# Patient Record
Sex: FEMALE | Race: WHITE | NOT HISPANIC OR LATINO | URBAN - METROPOLITAN AREA
[De-identification: names, ages, dates, MRNs, and addresses within clinical notes are randomized per-mention and may not be internally consistent; named-entity substitution may affect disease eponyms.]

---

## 2017-01-04 ENCOUNTER — HOSPITAL ENCOUNTER (OUTPATIENT)
Dept: CT IMAGING | Facility: CLINIC | Age: 82
Setting detail: RADIATION/ONCOLOGY SERIES
Discharge: STILL ADMITTED OR ADMINISTRATIVE | End: 2017-01-04
Attending: INTERNAL MEDICINE

## 2017-01-04 ENCOUNTER — COMMUNICATION - HEALTHEAST (OUTPATIENT)
Dept: TELEHEALTH | Facility: CLINIC | Age: 82
End: 2017-01-04

## 2017-01-04 DIAGNOSIS — C79.9 SECONDARY MALIGNANT NEOPLASM OF UNSPECIFIED SITE (CODE) (H): ICD-10-CM

## 2017-01-04 DIAGNOSIS — C50.919 METASTATIC BREAST CANCER: ICD-10-CM

## 2017-01-11 ENCOUNTER — AMBULATORY - HEALTHEAST (OUTPATIENT)
Dept: INFUSION THERAPY | Facility: CLINIC | Age: 82
End: 2017-01-11

## 2017-01-11 ENCOUNTER — INFUSION - HEALTHEAST (OUTPATIENT)
Dept: INFUSION THERAPY | Facility: CLINIC | Age: 82
End: 2017-01-11

## 2017-01-11 ENCOUNTER — OFFICE VISIT - HEALTHEAST (OUTPATIENT)
Dept: ONCOLOGY | Facility: CLINIC | Age: 82
End: 2017-01-11

## 2017-01-11 DIAGNOSIS — C50.919 METASTATIC BREAST CANCER: ICD-10-CM

## 2017-01-11 DIAGNOSIS — C79.51 BONE METASTASES: ICD-10-CM

## 2017-01-11 DIAGNOSIS — C50.919 MALIGNANT NEOPLASM OF FEMALE BREAST (H): ICD-10-CM

## 2017-01-11 ASSESSMENT — MIFFLIN-ST. JEOR: SCORE: 1325.39

## 2017-01-13 LAB — BREAST CARCINOMA ASSOC AG(CA 27.29) - HISTORICAL: 24.8 U/ML

## 2017-02-07 ENCOUNTER — RECORDS - HEALTHEAST (OUTPATIENT)
Dept: LAB | Facility: CLINIC | Age: 82
End: 2017-02-07

## 2017-02-07 LAB
CHOLEST SERPL-MCNC: 189 MG/DL
FASTING STATUS PATIENT QL REPORTED: NORMAL
HDLC SERPL-MCNC: 59 MG/DL
LDLC SERPL CALC-MCNC: 113 MG/DL
TRIGL SERPL-MCNC: 85 MG/DL

## 2017-02-08 ENCOUNTER — INFUSION - HEALTHEAST (OUTPATIENT)
Dept: INFUSION THERAPY | Facility: CLINIC | Age: 82
End: 2017-02-08

## 2017-02-08 ENCOUNTER — OFFICE VISIT - HEALTHEAST (OUTPATIENT)
Dept: ONCOLOGY | Facility: CLINIC | Age: 82
End: 2017-02-08

## 2017-02-08 ENCOUNTER — AMBULATORY - HEALTHEAST (OUTPATIENT)
Dept: INFUSION THERAPY | Facility: CLINIC | Age: 82
End: 2017-02-08

## 2017-02-08 DIAGNOSIS — C50.919 METASTATIC BREAST CANCER: ICD-10-CM

## 2017-02-08 DIAGNOSIS — C50.919 MALIGNANT NEOPLASM OF FEMALE BREAST (H): ICD-10-CM

## 2017-02-08 DIAGNOSIS — C79.51 BONE METASTASES: ICD-10-CM

## 2017-02-08 RX ORDER — MIRTAZAPINE 15 MG/1
22.5 TABLET, FILM COATED ORAL AT BEDTIME
Status: SHIPPED | COMMUNITY
Start: 2017-02-08

## 2017-02-08 RX ORDER — ACETAMINOPHEN 500 MG
500 TABLET ORAL EVERY 6 HOURS PRN
Status: SHIPPED | COMMUNITY
Start: 2017-02-08

## 2017-02-10 LAB — BREAST CARCINOMA ASSOC AG(CA 27.29) - HISTORICAL: 28.3 U/ML

## 2017-03-09 ENCOUNTER — AMBULATORY - HEALTHEAST (OUTPATIENT)
Dept: INFUSION THERAPY | Facility: CLINIC | Age: 82
End: 2017-03-09

## 2017-03-09 ENCOUNTER — INFUSION - HEALTHEAST (OUTPATIENT)
Dept: INFUSION THERAPY | Facility: CLINIC | Age: 82
End: 2017-03-09

## 2017-03-09 ENCOUNTER — OFFICE VISIT - HEALTHEAST (OUTPATIENT)
Dept: ONCOLOGY | Facility: CLINIC | Age: 82
End: 2017-03-09

## 2017-03-09 DIAGNOSIS — C50.919 MALIGNANT NEOPLASM OF FEMALE BREAST (H): ICD-10-CM

## 2017-03-09 DIAGNOSIS — C50.919 METASTATIC BREAST CANCER: ICD-10-CM

## 2017-03-09 DIAGNOSIS — Z79.818 USE OF FULVESTRANT (FASLODEX): ICD-10-CM

## 2017-03-09 DIAGNOSIS — C79.51 BONE METASTASES: ICD-10-CM

## 2017-03-09 ASSESSMENT — MIFFLIN-ST. JEOR: SCORE: 1327.66

## 2017-03-11 LAB — BREAST CARCINOMA ASSOC AG(CA 27.29) - HISTORICAL: 28.7 U/ML

## 2017-04-05 ENCOUNTER — AMBULATORY - HEALTHEAST (OUTPATIENT)
Dept: INFUSION THERAPY | Facility: CLINIC | Age: 82
End: 2017-04-05

## 2017-04-05 ENCOUNTER — HOSPITAL ENCOUNTER (OUTPATIENT)
Dept: CT IMAGING | Facility: CLINIC | Age: 82
Discharge: HOME | End: 2017-04-05

## 2017-04-05 DIAGNOSIS — C50.919 MALIGNANT NEOPLASM OF FEMALE BREAST (H): ICD-10-CM

## 2017-04-05 DIAGNOSIS — C79.9 SECONDARY MALIGNANT NEOPLASM OF UNSPECIFIED SITE (CODE) (H): ICD-10-CM

## 2017-04-05 DIAGNOSIS — C50.919 METASTATIC BREAST CANCER: ICD-10-CM

## 2017-04-05 DIAGNOSIS — C79.51 BONE METASTASES: ICD-10-CM

## 2017-04-07 ENCOUNTER — OFFICE VISIT - HEALTHEAST (OUTPATIENT)
Dept: ONCOLOGY | Facility: CLINIC | Age: 82
End: 2017-04-07

## 2017-04-07 ENCOUNTER — AMBULATORY - HEALTHEAST (OUTPATIENT)
Dept: INFUSION THERAPY | Facility: CLINIC | Age: 82
End: 2017-04-07

## 2017-04-07 ENCOUNTER — INFUSION - HEALTHEAST (OUTPATIENT)
Dept: INFUSION THERAPY | Facility: CLINIC | Age: 82
End: 2017-04-07

## 2017-04-07 DIAGNOSIS — Z79.818 USE OF FULVESTRANT (FASLODEX): ICD-10-CM

## 2017-04-07 DIAGNOSIS — C50.919 METASTATIC BREAST CANCER: ICD-10-CM

## 2017-04-07 DIAGNOSIS — C50.919 MALIGNANT NEOPLASM OF BREAST (FEMALE) (H): ICD-10-CM

## 2017-04-07 DIAGNOSIS — C79.51 BONE METASTASES: ICD-10-CM

## 2017-04-07 DIAGNOSIS — C50.919 MALIGNANT NEOPLASM OF FEMALE BREAST (H): ICD-10-CM

## 2017-05-01 ENCOUNTER — RECORDS - HEALTHEAST (OUTPATIENT)
Dept: ADMINISTRATIVE | Facility: OTHER | Age: 82
End: 2017-05-01

## 2017-05-04 ENCOUNTER — INFUSION - HEALTHEAST (OUTPATIENT)
Dept: INFUSION THERAPY | Facility: CLINIC | Age: 82
End: 2017-05-04

## 2017-05-04 ENCOUNTER — OFFICE VISIT - HEALTHEAST (OUTPATIENT)
Dept: ONCOLOGY | Facility: CLINIC | Age: 82
End: 2017-05-04

## 2017-05-04 ENCOUNTER — AMBULATORY - HEALTHEAST (OUTPATIENT)
Dept: INFUSION THERAPY | Facility: CLINIC | Age: 82
End: 2017-05-04

## 2017-05-04 DIAGNOSIS — E83.52 HYPERCALCEMIA: ICD-10-CM

## 2017-05-04 DIAGNOSIS — C50.919 METASTATIC BREAST CANCER: ICD-10-CM

## 2017-05-04 DIAGNOSIS — C79.51 BONE METASTASES: ICD-10-CM

## 2017-05-04 DIAGNOSIS — C50.919 MALIGNANT NEOPLASM OF FEMALE BREAST (H): ICD-10-CM

## 2017-05-04 RX ORDER — ALBUTEROL SULFATE 90 UG/1
2 AEROSOL, METERED RESPIRATORY (INHALATION) EVERY 6 HOURS PRN
Status: SHIPPED | COMMUNITY
Start: 2017-05-04

## 2017-05-06 LAB — BREAST CARCINOMA ASSOC AG(CA 27.29) - HISTORICAL: 28 U/ML

## 2017-05-18 ENCOUNTER — COMMUNICATION - HEALTHEAST (OUTPATIENT)
Dept: ONCOLOGY | Facility: CLINIC | Age: 82
End: 2017-05-18

## 2017-05-24 ENCOUNTER — COMMUNICATION - HEALTHEAST (OUTPATIENT)
Dept: ONCOLOGY | Facility: CLINIC | Age: 82
End: 2017-05-24

## 2017-05-24 DIAGNOSIS — G31.83 LEWY BODY DEMENTIA WITH BEHAVIORAL DISTURBANCE (H): ICD-10-CM

## 2017-05-24 DIAGNOSIS — F02.818 LEWY BODY DEMENTIA WITH BEHAVIORAL DISTURBANCE (H): ICD-10-CM

## 2021-05-26 ENCOUNTER — RECORDS - HEALTHEAST (OUTPATIENT)
Dept: ADMINISTRATIVE | Facility: CLINIC | Age: 86
End: 2021-05-26

## 2021-05-27 ENCOUNTER — RECORDS - HEALTHEAST (OUTPATIENT)
Dept: ADMINISTRATIVE | Facility: CLINIC | Age: 86
End: 2021-05-27

## 2021-05-28 ENCOUNTER — RECORDS - HEALTHEAST (OUTPATIENT)
Dept: ADMINISTRATIVE | Facility: CLINIC | Age: 86
End: 2021-05-28

## 2021-05-30 ENCOUNTER — RECORDS - HEALTHEAST (OUTPATIENT)
Dept: ADMINISTRATIVE | Facility: CLINIC | Age: 86
End: 2021-05-30

## 2021-05-30 VITALS — WEIGHT: 199.8 LBS | BODY MASS INDEX: 33.25 KG/M2

## 2021-05-30 VITALS — WEIGHT: 195 LBS | BODY MASS INDEX: 32.49 KG/M2 | HEIGHT: 65 IN

## 2021-05-30 VITALS — HEIGHT: 65 IN | WEIGHT: 195.5 LBS | BODY MASS INDEX: 32.57 KG/M2

## 2021-05-31 ENCOUNTER — RECORDS - HEALTHEAST (OUTPATIENT)
Dept: ADMINISTRATIVE | Facility: CLINIC | Age: 86
End: 2021-05-31

## 2021-06-01 ENCOUNTER — RECORDS - HEALTHEAST (OUTPATIENT)
Dept: ADMINISTRATIVE | Facility: CLINIC | Age: 86
End: 2021-06-01

## 2021-06-08 NOTE — PROGRESS NOTES
[facility] Hematology and Oncology Progress Note    Patient: Dania Hoffman  MRN: [id number]  Date of Service: 01/11/2017        Reason for Visit    Follow-up of metastatic breast cancer with bone metastases.    Assessment and Plan    ECOG Performance   ECOG Performance Status: 3    Distress Assessment  Distress Assessment Score: No distress: No intervention needed today.    Pain    None.    Ms. Dania Hoffman is a 82 y.o. woman with a history of mastectomy on the left side for breast cancer in 1976.  She was treated with melphalan for 2 years as adjuvant treatment.  She was doing well until 2013 when her bump appeared just left of the sternum on the same side as her mastectomy.  Needle biopsy showed this to be an invasive ductal carcinoma, ER/MS positive and HER-2 negative.  She was found to have multiple bony metastasis, lung nodules and an internal mammary lymph node on the PET CT scan, in addition to the mass which is invading the bone of the chest wall.  She was started on letrozole 2.5 mg on 2/20/14.  This was continued until the CT scan on 4/11/16 showed progression of disease at the sternal lesion and also in the anterior mediastinum just posterior to that.  She also had a progressive rise in CA-27-29.  We started Faslodex on 4/13/16.  Xgeva was continued 3 monthly.    1.  I reviewed the results of the CT scan from 1/4/17 with the patient and her son.  At this time we see no evidence of progression of the breast cancer.  There are no new lesions seen.  The bone metastasis at the upper end of the sternum is still seen and has not increased in size.  I think the Faslodex and Xgeva combination continues to work.  I discussed with the son that from the description that they're giving it appears that her dementia is slowly progressing.  She is becoming more tired.  At this time we do not see any evidence of breast cancer progression and I do not think that the current treatment that she is receiving with  Faslodex and Xgeva for the breast cancer is causing any adverse effect for her.  I would recommend continuing with the same.  They were in agreement.    2.  Her blood counts and metabolic panel from today are stable.  She will receive the Faslodex injections today.  She will also receive Xgeva but is being given every 3 months for the bone metastases today.    3.  We will continue with monthly Faslodex.  Return to clinic with me or Josias Moeller NP in a month's time with labs according to treatment and plan an appointment for Faslodex injections.    4.  We will plan on another CT scan in 3 months and Xgeva injections in 3 months.    Time spend >25 minutes face to face with the patient. More than 50 % in counseling and coordination of care.      Problem List    1. Metastatic breast cancer     2. Bone metastases          CC: Fidencio Welsh MD    ______________________________________________________________________________    History of Present Illness    Ms. Dania Hoffman is here for follow-up with her son.  She says that she feels more tired.  Apparently at the assisted living center she is doing much less.  She is not walking around much even with the walker.  Apparently she has had a couple of falls.  Fortunately she did not get hurt.  It appears that there may be some worsening of her memory.  She says that she is waking up now and then at night.    She does not have any pain anywhere.  She says that she is eating well and has a good appetite.  Apparently she is recovering from a UTI and today is the last day of antibiotics.  They do not know the name of the antibiotic.    No unusual headaches.  No vision changes.  No mouth sores.  No swallowing difficulty.  No new lumps or bumps anywhere.  No fevers or night sweats.  Breathing is fine.  No chest pain or cough.  No abdominal pain.  No constipation or diarrhea.  No numbness or tingling in hands or feet.    Please see below.  A 14 point review of system is  "otherwise completely negative.    Pain Status  Currently in Pain: No/denies    Review of Systems    Constitutional  Constitutional (WDL): Exceptions to WDL  Fatigue: Fatigue not relieved by rest - Limiting instrumental ADL  Fever: None  Chills: None  Weight Gain: 5 - <10% from baseline (up 10 lbs in one month)  Weight Loss: None  Neurosensory  Neurosensory (WDL): Exceptions to WDL  Peripheral Motor Neuropathy: Moderate symptoms, limiting instrumental ADL (falls twice in last two weeks)  Ataxia: Moderate symptoms, limiting instrumental ADL  Peripheral Sensory Neuropathy: None  Confusion: Mild disorientation  Syncope: None  Cardiovascular  Cardiovascular (WDL): Exceptions to WDL  Edema: Yes  Pulmonary  Respiratory (WDL): Within Defined Limits  Gastrointestinal  Gastrointestinal (WDL): All gastrointestinal elements are within defined limits  Anorexia: None  Constipation: None  Diarrhea: None  Dysphagia: None  Esophagitis: None  Nausea: None  Pharyngitis: None  Vomiting: None  Dysgeusia: None  Dry Mouth: None  Genitourinary  Genitourinary (WDL): Exceptions to WDL (may be actively being treated for UTI)  Integumentary  Integumentary (WDL): Exceptions to WDL (skin tear d/t fall Rt wrist, dry skin)  Patient Coping  Patient Coping: Accepting;Other (Comment) (tired, \"worried about roads\")  Distress Assessment  Distress Assessment Score: No distress  Accompanied by  Accompanied by: Family Member    Past History  Past Medical History   Diagnosis Date     Asthma      Breast cancer      Left-sided breast cancer for which she had a mastectomy done in 1976 at [facility] for invasive ductal cancer. It was 4 cm in size with a positive left axillary lymph node.  She was treated adjuvantly with melphalan for 2 years.     GERD (gastroesophageal reflux disease)      Hyperlipidemia      Hypertension          Past Surgical History   Procedure Laterality Date     Pr mastectomy, radical       Description: Radical Mastectomy Left " "Breast;  Recorded: 11/19/2008;  Comments: 1976     Pr removal gallbladder       Description: Cholecystectomy;  Recorded: 11/19/2008;     Pr total abdom hysterectomy       Description: Hysterectomy;  Recorded: 06/21/2013;  Comments: 1973 Has Ovaries     Cataract extraction Right        Physical Exam    Recent Vitals 1/11/2017   Height 5' 5\"   Weight 195 lbs   BSA (m2) 2.01 m2   /64   Pulse 71   Temp 97.5   Temp src 1   SpO2 99       GENERAL: Alert and oriented to place and person. Seated comfortably in a wheelchair. In no distress.  She looks weaker today.    HEAD: Atraumatic and normocephalic.  Has a full head of hair.    EYES: MAGGIE, EOMI.  No pallor.  No icterus.    Oral cavity: no mucosal lesion or tonsillar enlargement.    NECK: supple. JVP normal.  No thyroid enlargement.    LYMPH NODES: No palpable, cervical, axillary or inguinal lymphadenopathy.    CHEST: clear to auscultation bilaterally.  Resonant to percussion throughout bilaterally.  Symmetrical breath movements bilaterally.  No palpable swelling now over the anterior sternum.    CVS: S1 and S2 are heard. Regular rate and rhythm.  No murmur or gallop or rub heard.    ABDOMEN: Soft. Not tender. Not distended.  No palpable hepatomegaly or splenomegaly.  No other mass palpable.  Bowel sounds heard.    EXTREMITIES: Warm.  Trace edema.    SKIN: no rash, or bruising or purpura.          Lab Results    Recent Results (from the past 168 hour(s))   Urinalysis-UC if Indicated   Result Value Ref Range    Color, UA Yellow Colorless, Yellow, Straw, Light Yellow    Clarity, UA Cloudy (!) Clear    Glucose, UA Negative Negative    Bilirubin, UA Negative Negative    Ketones, UA Negative Negative    Specific Gravity, UA 1.025 1.001 - 1.030    Blood, UA Trace (!) Negative    pH, UA 6.5 4.5 - 8.0    Protein, UA Trace (!) Negative mg/dL    Urobilinogen, UA 4.0 E.U./dL (!) <2.0 E.U./dL, 2.0 E.U./dL    Nitrite, UA Negative Negative    Leukocytes, UA Large (!) Negative    " Bacteria, UA Few (!) None Seen hpf    RBC, UA 0-2 None Seen, 0-2 hpf    WBC, UA 25-50 (!) None Seen, 0-5 hpf    Squam Epithel, UA 5-10 (!) None Seen, 0-5 lpf    WBC Clumps Present (!) None Seen    Mucus, UA Few (!) None Seen lpf    Ca Oxalate Shereen, UA Present (!) None Seen   Culture, Urine   Result Value Ref Range    Culture Mixture of urogenital organisms    Basic Metabolic Panel   Result Value Ref Range    Sodium 145 136 - 145 mmol/L    Potassium 4.1 3.5 - 5.0 mmol/L    Chloride 111 (H) 98 - 107 mmol/L    CO2 27 22 - 31 mmol/L    Anion Gap, Calculation 7 5 - 18 mmol/L    Glucose 94 70 - 125 mg/dL    Calcium 9.4 8.5 - 10.5 mg/dL    BUN 27 8 - 28 mg/dL    Creatinine 0.84 0.60 - 1.10 mg/dL    GFR MDRD Af Amer >60 >60 mL/min/1.73m2    GFR MDRD Non Af Amer >60 >60 mL/min/1.73m2   HM2(CBC w/o Differential)   Result Value Ref Range    WBC 4.3 4.0 - 11.0 thou/uL    RBC 3.72 (L) 3.80 - 5.40 mill/uL    Hemoglobin 11.8 (L) 12.0 - 16.0 g/dL    Hematocrit 35.8 35.0 - 47.0 %    MCV 96 80 - 100 fL    MCH 31.7 27.0 - 34.0 pg    MCHC 33.0 32.0 - 36.0 g/dL    RDW 13.5 11.0 - 14.5 %    Platelets 208 140 - 440 thou/uL    MPV 9.8 8.5 - 12.5 fL   Comprehensive Metabolic Panel   Result Value Ref Range    Sodium 143 136 - 145 mmol/L    Potassium 4.5 3.5 - 5.0 mmol/L    Chloride 109 (H) 98 - 107 mmol/L    CO2 24 22 - 31 mmol/L    Anion Gap, Calculation 10 5 - 18 mmol/L    Glucose 100 70 - 125 mg/dL    BUN 21 8 - 28 mg/dL    Creatinine 0.87 0.60 - 1.10 mg/dL    GFR MDRD Af Amer >60 >60 mL/min/1.73m2    GFR MDRD Non Af Amer >60 >60 mL/min/1.73m2    Bilirubin, Total 0.4 0.0 - 1.0 mg/dL    Calcium 9.4 8.5 - 10.5 mg/dL    Protein, Total 6.0 6.0 - 8.0 g/dL    Albumin 3.2 (L) 3.5 - 5.0 g/dL    Alkaline Phosphatase 77 45 - 120 U/L    AST 14 0 - 40 U/L    ALT 12 0 - 45 U/L   HM1 (CBC with Diff)   Result Value Ref Range    WBC 4.5 4.0 - 11.0 thou/uL    RBC 4.04 3.80 - 5.40 mill/uL    Hemoglobin 12.4 12.0 - 16.0 g/dL    Hematocrit 37.7 35.0 - 47.0 %     MCV 93 80 - 100 fL    MCH 30.7 27.0 - 34.0 pg    MCHC 33.0 32.0 - 36.0 g/dL    RDW 14.2 11.0 - 14.5 %    Platelets 222 140 - 440 thou/uL    MPV 7.0 7.0 - 10.0 fL    Neutrophils % 74 (H) 50 - 70 %    Lymphocytes % 14 (L) 20 - 40 %    Monocytes % 9 2 - 10 %    Eosinophils % 2 0 - 6 %    Basophils % 1 0 - 2 %    Neutrophils Absolute 3.3 2.0 - 7.7 thou/uL    Lymphocytes Absolute 0.6 (L) 0.8 - 4.4 thou/uL    Monocytes Absolute 0.4 0.0 - 0.9 thou/uL    Eosinophils Absolute 0.1 0.0 - 0.4 thou/uL    Basophils Absolute 0.0 0.0 - 0.2 thou/uL       Imaging    Ct Chest Abdomen Pelvis With Oral With Iv Cont    Result Date: 1/4/2017  CT CHEST, ABDOMEN, AND PELVIS 1/4/2017 8:41 AM      INDICATION: Neoplasm - extra-abdominal primary TECHNIQUE: CT chest, abdomen, and pelvis. Dose reduction techniques were used. IV CONTRAST: Iohexol (Omni) 100 mL COMPARISON: 10/03/2016 FINDINGS: CHEST: There are some calcified granulomas. A small pleural-based lesion right upper lobe may be some scarring and hasn't changed. The mediastinum is normal. Left mastectomy.  ABDOMEN: The liver, spleen, pancreas, and adrenal glands are normal. There are some renal cysts which haven't changed. No enlarged lymph nodes. PELVIS: The contents of the pelvis are normal. Hysterectomy. MUSCULOSKELETAL: Metastasis involving the craniad aspect of the sternum. There is a small mass posterior to the involved portion of the sternum. It measures 1.7 cm in the craniocaudal dimension and is 0.5 cm thick. It hasn't changed since the comparison study.     CONCLUSION: 1.  No change to the sternal metastasis.        Signed by: Gisel Gould MD

## 2021-06-08 NOTE — PROGRESS NOTES
[facility] Hematology and Oncology Progress Note    Patient: Dania Hoffman  MRN: [id number]  Date of Service: 02/08/2017        Reason for Visit    Follow-up of metastatic breast cancer with bone metastases.    Assessment and Plan    ECOG Performance   ECOG Performance Status: 1    Distress Assessment  Distress Assessment Score: No distress: No intervention needed today.    Pain    None.    Ms. Dania Hoffman is a 82 y.o. woman with a history of mastectomy on the left side for breast cancer in 1976.  She was treated with melphalan for 2 years as adjuvant treatment.  She was doing well until 2013 when her bump appeared just left of the sternum on the same side as her mastectomy.  Needle biopsy showed this to be an invasive ductal carcinoma, ER/AR positive and HER-2 negative.  She was found to have multiple bony metastasis, lung nodules and an internal mammary lymph node on the PET CT scan, in addition to the mass which is invading the bone of the chest wall.  She was started on letrozole 2.5 mg on 2/20/14.  This was continued until the CT scan on 4/11/16 showed progression of disease at the sternal lesion and also in the anterior mediastinum just posterior to that.  She also had a progressive rise in CA-27-29.  We started Faslodex on 4/13/16.  Xgeva was continued 3 monthly.  The CT scan from 1/4/17 showed no evidence of progression of breast cancer.  The CA-27-29 level also remains stable.    1.  At this time she appears to be doing okay from the breast cancer point a few.  However there appears to be a slow deterioration in her activity level and perhaps in her dementia.  At this time we will continue with monthly Faslodex and 3 monthly Xgeva.  Her blood counts and metabolic panel from today are reviewed and they're stable.  Please see below.  She will receive the Faslodex injections today.    2.  She has a bit more of pedal edema today.  She is already started on Lasix.  She is trying to walk a bit more.   She is also trying to watch her diet.  I suspect that the 5 pound weight increase is mostly due to fluid retention.  Defer management to PCP.    3.  Reviewed the medication list from the nursing home.  She is on vitamin D3.  However she is not on calcium.  She says that she has a bit of difficulty swallowing the calcium pills when she was in the assisted living facility.  I asked her to take Tums 500 mg tablets twice a day.  She cannot chew them and so they should be easier to swallow.  She needs to be on calcium and vitamin D because she is on Xgeva.    4.  Return to clinic with Josias Moeller NP in a month's time with labs according to treatment plan and appointment for Faslodex injection.    5.  We'll plan for a CT scan in 2 months and also Xgeva in 2 months.    Time spend 25 minutes face to face with the patient. More than 50 % in counseling and coordination of care.      Problem List    1. Metastatic breast cancer     2. Bone metastases          CC: Fidencio Welsh MD    ______________________________________________________________________________    History of Present Illness    Ms. Dania Hoffman is here for follow-up with her daughter.  Overall she feels that she is doing okay.  Her main concern is about leg swelling which she feels has increased in the last week.  She is on Lasix for edema.  She is going to try to walk some more.  Apparently she has not been very active in the nursing home.  She was seen by her PCP earlier in the week today.  She says that she is planning to eat slightly less now.  She has gained about 5 pounds of weight since coming to the facility that she is in currently.  Now and then she has some body aches and backache.  She takes Tylenol for pain.  She feels that the pain is somewhat better.  She is pain-free now.  Apparently she has had a couple of falls.  Apparently she has not had any significant wounds or fractures from the falls.  It appears that she is a bit more unsteady  on her feet.    No fevers or night sweats.  No lumps or bumps anywhere.  No unusual headaches.  Vision remains stable.  Breathing is fine.  No chest pain or cough.  No nausea or vomiting.  No abdominal pain.  No constipation or diarrhea.  No vaginal bleeding.  No numbness or tingling in hands or feet.    Please see below.  A 14 point review of system is otherwise completely negative.      Pain Status  Currently in Pain: No/denies    Review of Systems    Constitutional  Constitutional (WDL): Exceptions to WDL  Fatigue: Fatigue relieved by rest  Fever: None  Chills: None  Weight Gain: None  Weight Loss: None  Neurosensory  Neurosensory (WDL): All neurosensory elements are within defined limits  Peripheral Motor Neuropathy: None  Ataxia: Moderate symptoms, limiting instrumental ADL  Peripheral Sensory Neuropathy: None  Confusion: Mild disorientation  Syncope: None  Cardiovascular  Cardiovascular (WDL): Exceptions to WDL  Palpitations: Definition: A disorder characterized by inflammation of the muscle tissue of the heart.  Edema: Yes  Phlebitis: None  Superficial thrombophlebitis: None  Pulmonary  Respiratory (WDL): Within Defined Limits  Cough: None  Dyspnea: None  Hypoxia: None  Gastrointestinal  Gastrointestinal (WDL): All gastrointestinal elements are within defined limits  Anorexia: None  Constipation: None  Diarrhea: None  Dysphagia: None  Esophagitis: None  Nausea: None  Pharyngitis: None  Vomiting: None  Dysgeusia: None  Dry Mouth: None  Genitourinary  Genitourinary (WDL): Exceptions to WDL  Urinary Frequency: Present  Urinary Retention: None  Urinary Tract Pain: None  Integumentary  Integumentary (WDL): All integumentary elements are within defined limits  Alopecia: None  Rash Maculo-Papular: None  Pruritus: None  Urticaria: None  Palmar-Plantar Erythrodysesthesia Syndrome: None  Flushing: None  Patient Coping  Patient Coping: Accepting  Distress Assessment  Distress Assessment Score: No distress  Accompanied  by  Accompanied by: Family Member    Past History  Past Medical History:   Diagnosis Date     Asthma      Breast cancer     Left-sided breast cancer for which she had a mastectomy done in 1976 at [facility] for invasive ductal cancer. It was 4 cm in size with a positive left axillary lymph node.  She was treated adjuvantly with melphalan for 2 years.     GERD (gastroesophageal reflux disease)      Hyperlipidemia      Hypertension          Past Surgical History:   Procedure Laterality Date     CATARACT EXTRACTION Right      MD MASTECTOMY, RADICAL      Description: Radical Mastectomy Left Breast;  Recorded: 11/19/2008;  Comments: 1976     MD REMOVAL GALLBLADDER      Description: Cholecystectomy;  Recorded: 11/19/2008;     MD TOTAL ABDOM HYSTERECTOMY      Description: Hysterectomy;  Recorded: 06/21/2013;  Comments: 1973 Has Ovaries       Physical Exam    Recent Vitals 2/8/2017   Height -   Weight 199 lbs 13 oz   BSA (m2) -   /60   Pulse 64   Temp 97.4   Temp src 1   SpO2 98       GENERAL: Alert and oriented. Seated comfortably. In no distress.  She looks somewhat brighter than in the last visit today.    HEAD: Atraumatic and normocephalic.  Has a full head of hair.    EYES: MAGGIE, EOMI.  No pallor.  No icterus.    Oral cavity: no mucosal lesion or tonsillar enlargement.    NECK: supple. JVP normal.  No thyroid enlargement.    LYMPH NODES: No palpable, cervical, axillary or inguinal lymphadenopathy.    CHEST: clear to auscultation bilaterally.  Resonant to percussion throughout bilaterally.  Symmetrical breath movements bilaterally.  The swelling over the anterior sternum is no longer palpable.    CVS: S1 and S2 are heard. Regular rate and rhythm.  No murmur or gallop or rub heard.    ABDOMEN: Soft. Not tender. Not distended.  No palpable hepatomegaly or splenomegaly.  No other mass palpable.  Bowel sounds heard.    EXTREMITIES: Warm.  1+ ankle edema.    SKIN: no rash, or bruising or purpura.          Lab  Results    Recent Results (from the past 168 hour(s))   Lipid Profile   Result Value Ref Range    Triglycerides 85 <=149 mg/dL    Cholesterol 189 <=199 mg/dL    LDL Calculated 113 <=129 mg/dL    HDL Cholesterol 59 >=50 mg/dL    Patient Fasting > 8hrs? Unknown    Comprehensive Metabolic Panel   Result Value Ref Range    Sodium 144 136 - 145 mmol/L    Potassium 3.8 3.5 - 5.0 mmol/L    Chloride 109 (H) 98 - 107 mmol/L    CO2 28 22 - 31 mmol/L    Anion Gap, Calculation 7 5 - 18 mmol/L    Glucose 113 70 - 125 mg/dL    BUN 25 8 - 28 mg/dL    Creatinine 1.06 0.60 - 1.10 mg/dL    GFR MDRD Af Amer 60 (L) >60 mL/min/1.73m2    GFR MDRD Non Af Amer 50 (L) >60 mL/min/1.73m2    Bilirubin, Total 0.5 0.0 - 1.0 mg/dL    Calcium 9.9 8.5 - 10.5 mg/dL    Protein, Total 6.0 6.0 - 8.0 g/dL    Albumin 3.1 (L) 3.5 - 5.0 g/dL    Alkaline Phosphatase 88 45 - 120 U/L    AST 15 0 - 40 U/L    ALT 12 0 - 45 U/L   HM1 (CBC with Diff)   Result Value Ref Range    WBC 4.3 4.0 - 11.0 thou/uL    RBC 4.17 3.80 - 5.40 mill/uL    Hemoglobin 12.8 12.0 - 16.0 g/dL    Hematocrit 37.9 35.0 - 47.0 %    MCV 91 80 - 100 fL    MCH 30.8 27.0 - 34.0 pg    MCHC 33.8 32.0 - 36.0 g/dL    RDW 13.9 11.0 - 14.5 %    Platelets 228 140 - 440 thou/uL    MPV 7.1 7.0 - 10.0 fL    Neutrophils % 73 (H) 50 - 70 %    Lymphocytes % 16 (L) 20 - 40 %    Monocytes % 8 2 - 10 %    Eosinophils % 3 0 - 6 %    Basophils % 1 0 - 2 %    Neutrophils Absolute 3.2 2.0 - 7.7 thou/uL    Lymphocytes Absolute 0.7 (L) 0.8 - 4.4 thou/uL    Monocytes Absolute 0.3 0.0 - 0.9 thou/uL    Eosinophils Absolute 0.1 0.0 - 0.4 thou/uL    Basophils Absolute 0.0 0.0 - 0.2 thou/uL       Imaging    No results found.      Signed by: Gisel Gould MD

## 2021-06-09 NOTE — PROGRESS NOTES
[facility] Hematology and Oncology Progress Note    Patient: Dania Hoffman  MRN: [id number]  Date of Service: 04/07/2017        Reason for Visit    Follow-up of metastatic breast cancer with bone metastases.    Assessment and Plan    ECOG Performance    2-3.    Distress Assessment   : No intervention needed today.    Pain    None.    Ms. Dania Hoffman is a 82 y.o. woman with a history of mastectomy on the left side for breast cancer in 1976.  She was treated with melphalan for 2 years as adjuvant treatment.  She was doing well until 2013 when her bump appeared just left of the sternum on the same side as her mastectomy.  Needle biopsy showed this to be an invasive ductal carcinoma, ER/ID positive and HER-2 negative.  She was found to have multiple bony metastasis, lung nodules and an internal mammary lymph node on the PET CT scan, in addition to the mass which is invading the bone of the chest wall.  She was started on letrozole 2.5 mg on 2/20/14.  This was continued until the CT scan on 4/11/16 showed progression of disease at the sternal lesion and also in the anterior mediastinum just posterior to that.  She also had a progressive rise in CA-27-29.  We started Faslodex on 4/13/16.  Xgeva was continued 3 monthly.       1.  I reviewed the images of the CT scan from 4/5/17.  There is no evidence of progression of the breast cancer.  In fact there are no new areas of concern that are seen.  I conveyed the results to her and her son.  They were quite glad to hear it.  Labs from 4/5/17 were reviewed.  Her blood counts remained stable.  Her metabolic panel also remained stable.  Unfortunately the CA-27-29 blood sample hemolyzed and so we could not check it.  We contemplated whether to draw another sample today and then decided against it because she does not have any evidence of progression of disease in the CT scan.    2.  Even though she appears to be doing quite well from the breast cancer point of view with  no progression of disease, she appears to be worsening in terms of dementia.  She herself is aware that her memory is getting worse.  Her overall appearance also looks weaker.  It appears to me that her activity level has also come down significantly in the last 3 months.  She asked me whether to continue with Aricept.  I told her that it does not appear to be doing any harm to her.  I would defer that decision to her primary doctor.    3.  She will receive her monthly dose of Faslodex injection today.    4.  She will be given the 3 monthly dose of Xgeva for bone metastases today.    5.  Return to clinic with me on Josias Moeller NP in a month's time with labs according to treatment plan and appointment for Precedex again.    6.  At this point our overall plan is to continue with monthly Faslodex and 3 monthly Xgeva.  We will plan on following the CA-27-29 level and on repeating a CT scan of the chest abdomen and pelvis in 3 months time.    Time spend >25 minutes face to face with the patient. More than 50 % in counseling and coordination of care.      Problem List    1. Metastatic breast cancer     2. Bone metastases     3. Use of fulvestrant (Faslodex)          CC: Fidencio Welsh MD    ______________________________________________________________________________    History of Present Illness    Ms. Dania Hoffman is here for follow-up with her son.    She offers very little in terms of complaints.  She says that that is a spot on her right shoulder skin which she wants me to check out.  She says that the swelling on her sternum comes and goes.  Other than that she says that she is doing okay.  No other complaints.    She says that she is walking around in the nursing home with a walker.  It is clear from the description of her son that her activity level has come down significantly.  She herself admits that her memory is not doing well.  Clearly she is talking much less now.    She says that she does not have  any pain anywhere.  She says that she is eating well.  No unusual headaches.  Vision is stable.  No mouth sores.  No swallowing difficulty.  No chest pain.  No shortness of breath.  No cough.  No abdominal pain.  She says that she does not have any constipation or diarrhea.  No vaginal bleeding.  No difficulty passing urine.    A 14 point review of system is otherwise completely negative.      Pain Status       Review of Systems    Constitutional     Neurosensory     Cardiovascular     Pulmonary     Gastrointestinal     Genitourinary     Integumentary     Patient Coping     Distress Assessment     Accompanied by       Past History  Past Medical History:   Diagnosis Date     Asthma      Breast cancer     Left-sided breast cancer for which she had a mastectomy done in 1976 at [facility] for invasive ductal cancer. It was 4 cm in size with a positive left axillary lymph node.  She was treated adjuvantly with melphalan for 2 years.     GERD (gastroesophageal reflux disease)      Hyperlipidemia      Hypertension          Past Surgical History:   Procedure Laterality Date     CATARACT EXTRACTION Right      NM MASTECTOMY, RADICAL      Description: Radical Mastectomy Left Breast;  Recorded: 11/19/2008;  Comments: 1976     NM REMOVAL GALLBLADDER      Description: Cholecystectomy;  Recorded: 11/19/2008;     NM TOTAL ABDOM HYSTERECTOMY      Description: Hysterectomy;  Recorded: 06/21/2013;  Comments: 1973 Has Ovaries       Physical Exam    Recent Vitals 4/7/2017   Height -   Weight -   BSA (m2) -   /67   Pulse 56   Temp 97.4   Temp src 1   SpO2 98       GENERAL: Alert and oriented.  Seated in a wheelchair.  She looks more bent over.  She looks more tired.    HEAD: Atraumatic and normocephalic.  Has a full head of hair.    EYES: MAGGIE, EOMI.  No pallor.  No icterus.    Oral cavity: no mucosal lesion or tonsillar enlargement.    NECK: supple. JVP normal.  No thyroid enlargement.    LYMPH NODES: No palpable, cervical,  axillary or inguinal lymphadenopathy.    CHEST: clear to auscultation bilaterally.  Symmetrical breath movements bilaterally.  The sternal swelling is no longer evident.    CVS: S1 and S2 are heard. Regular rate and rhythm.  No murmur or gallop or rub heard.    ABDOMEN: Soft. Not tender. Not distended.  No palpable hepatomegaly or splenomegaly.  No other mass palpable.  Bowel sounds heard.    EXTREMITIES: Warm.  Trace peripheral edema.    SKIN: no rash, or bruising or purpura.  She has multiple senile keratosis on her skin.  The lesion that she refers to on her right shoulder is a lesion of senile keratosis.          Lab Results    Recent Results (from the past 168 hour(s))   Basic Metabolic Panel   Result Value Ref Range    Sodium 144 136 - 145 mmol/L    Potassium 3.8 3.5 - 5.0 mmol/L    Chloride 109 (H) 98 - 107 mmol/L    CO2 26 22 - 31 mmol/L    Anion Gap, Calculation 9 5 - 18 mmol/L    Glucose 109 70 - 125 mg/dL    Calcium 10.6 (H) 8.5 - 10.5 mg/dL    BUN 29 (H) 8 - 28 mg/dL    Creatinine 1.02 0.60 - 1.10 mg/dL    GFR MDRD Af Amer >60 >60 mL/min/1.73m2    GFR MDRD Non Af Amer 52 (L) >60 mL/min/1.73m2   Comprehensive Metabolic Panel   Result Value Ref Range    Sodium 144 136 - 145 mmol/L    Potassium 4.8 3.5 - 5.0 mmol/L    Chloride 109 (H) 98 - 107 mmol/L    CO2 25 22 - 31 mmol/L    Anion Gap, Calculation 10 5 - 18 mmol/L    Glucose 89 70 - 125 mg/dL    BUN 25 8 - 28 mg/dL    Creatinine 0.99 0.60 - 1.10 mg/dL    GFR MDRD Af Amer >60 >60 mL/min/1.73m2    GFR MDRD Non Af Amer 54 (L) >60 mL/min/1.73m2    Bilirubin, Total 0.6 0.0 - 1.0 mg/dL    Calcium 10.6 (H) 8.5 - 10.5 mg/dL    Protein, Total 6.9 6.0 - 8.0 g/dL    Albumin 3.4 (L) 3.5 - 5.0 g/dL    Alkaline Phosphatase 70 45 - 120 U/L    AST 20 0 - 40 U/L    ALT 9 0 - 45 U/L   HM1 (CBC with Diff)   Result Value Ref Range    WBC 5.7 4.0 - 11.0 thou/uL    RBC 4.64 3.80 - 5.40 mill/uL    Hemoglobin 13.9 12.0 - 16.0 g/dL    Hematocrit 42.2 35.0 - 47.0 %    MCV 91 80 -  100 fL    MCH 30.0 27.0 - 34.0 pg    MCHC 32.9 32.0 - 36.0 g/dL    RDW 13.7 11.0 - 14.5 %    Platelets 250 140 - 440 thou/uL    MPV 9.3 8.5 - 12.5 fL    Neutrophils % 74 (H) 50 - 70 %    Lymphocytes % 16 (L) 20 - 40 %    Monocytes % 8 2 - 10 %    Eosinophils % 2 0 - 6 %    Basophils % 0 0 - 2 %    Neutrophils Absolute 4.2 2.0 - 7.7 thou/uL    Lymphocytes Absolute 0.9 0.8 - 4.4 thou/uL    Monocytes Absolute 0.5 0.0 - 0.9 thou/uL    Eosinophils Absolute 0.1 0.0 - 0.4 thou/uL    Basophils Absolute 0.0 0.0 - 0.2 thou/uL       Imaging    Ct Chest Abdomen Pelvis With Oral With Iv Cont    Result Date: 4/5/2017  CT CHEST, ABDOMEN, AND PELVIS 4/5/2017 11:09 AM      INDICATION: Neoplasm - extra-abdominal primary. Metastatic breast cancer. TECHNIQUE: CT chest, abdomen, and pelvis. Dose reduction techniques were used. IV CONTRAST: Iohexol (Omni) 75mL COMPARISON: 01/04/2017 and 01/07/2015 FINDINGS: CHEST: Tiny bilateral calcified granulomata and left apical bleb or peripheral pneumatocele unchanged. Tiny triangular pleural-based nodule right lung stable and benign. No lymphadenopathy. Left mastectomy.  ABDOMEN: Liver, bile ducts, pancreas, spleen and adrenals are normal. Stable renal cysts. No lymphadenopathy. PELVIS: Colonic diverticulosis but no active inflammatory change. No adnexal mass or free fluid. No adenopathy. MUSCULOSKELETAL: Mixed sclerotic and lytic sternal metastases again evident. Tiny soft tissue density along posterior wall of the sternum unchanged.     CONCLUSION: 1.  Stable exam with bony metastases. No new area of involvement. No adenopathy.        Signed by: Gisel Gould MD

## 2021-06-09 NOTE — PROGRESS NOTES
[facility] Hematology and Oncology Progress Note    Patient: Dania Hoffman  MRN: [id number]  Date of Service: 3/9/2017         Reason for Visit:    Follow-up of metastatic breast cancer (bone).    Assessment and Plan:    1) Metastatic breast cancer:    82 y.o.     1976 - left mastectomy.     Treated with melphalan for 2 years as adjuvant therapy.     2013 noted sternal lump on the same side as her mastectomy.     Needle biopsy showed this to be an invasive ductal carcinoma, ER/ME positive and HER-2 negative.     2014, February PET showed multiple bony metastasis, lung nodules and an internal mammary lymph node in addition to the mass invading the bone of the chest wall.     2014, February - began letrozole.    2016, April 11 CT chest abdomen and pelvis showed progression of disease in the sternal lesion and soft tissue posterior to the mediastinum.     Progressive rise in the CA-27-29 biochemical marker.      04/13/2016 - patient switched to palliative Faslodex, every 2 weeks as a loading dose x 3, then once monthly.     10/03/16 CT chest, abdomen and pelvis - soft tissue mass deep to the sternum had decreased further in size.  No new disease.    03/09/17 - feels good and has a good quality of life.  Gaining weight.  Continues as resident of [facility].  She has gained about 20 pounds since last fall.    CBC - basically WNL    CMP - quite stable other than an intermittently low GFR on lasix and chronically high chloride.        CA27.29 pending (28.3, 24.8, 22.4, 23.6, 21.5, 23.4, 22.7, 14.7, 24.0, 23.8, 25.1, 42 on prior evaluations).    Clinically and biochemically JUSTYNA.  Continue monthly Faslodex.    04/06/17 - follow up with restaging CT chest, abdomen and pelvis in consideration for her monthly Faslodex and every 3 month Xgeva.    Considering adding Ibrance if needed.    2) Bone metastasis:    Continue daily calcium and vitamin D.    Receiving Xgeva every 3 months.    04/11/17 - next due.    "    3) Dementia:    Stable on Aricept, 10 mg.     Her family thinks her anxiety is stable as well.       4) Pedal edema:    Improved with Lasix, 20 mg twice daily.     Her metabolic panel was reviewed and her electrolytes quite stable other than a chronic mildly elevated chloride and intermittently low GFR.     ECOG Performance:     ECOG Performance Status: 0    Distress Assessment:    Distress Assessment Score: 2        > 25 minutes with > 50% counseling and care coordination.    Josias Moeller, CNP     CC: Fidencio Welsh MD    ______________________________________________________________________________    Interim History:    The patient presents today looking and feeling quite good and in good spirits, accompanied by her son.  She appears to be enjoying her life at the [facility] assisted living facility.  Her activities consist of walking the corridors, being a part of a band and playing cards.  She denies pain, new lumps or bumps, cough, fever, chills, headache, visual disturbance, bowel or bladder issues, vaginal discharge, numbness or tingling or rashes.  Her appetite is good and her weight up a bit.  Her memory problems are stable according to her son.  She states about once a week she has a \"nightmare\" that someone is chasing her and her PCP is looking into making some medication adjustments.  She continues on Aricept and Remeron.      Pain Status:    Currently in Pain: No/denies    Review of Systems:    Constitutional  Constitutional (WDL): Exceptions to WDL  Fatigue: None  Fever: None  Chills: None  Weight Loss: to <10% from baseline, intervention not indicated (4# loss since 2/8/17)  Neurosensory  Neurosensory (WDL): All neurosensory elements are within defined limits  Cardiovascular  Cardiovascular (WDL): Exceptions to WDL  Palpitations: None  Edema: Yes (wearing teds-wears 12 hours on and 12 hours off)  Phlebitis: None  Superficial thrombophlebitis: None  Pulmonary  Respiratory (WDL): Within " "Defined Limits  Gastrointestinal  Gastrointestinal (WDL): All gastrointestinal elements are within defined limits  Genitourinary     Integumentary  Integumentary (WDL): All integumentary elements are within defined limits  Patient Coping     Distress Assessment  Distress Assessment Score: 2  Accompanied by  Accompanied by: Family Member (Son, Fidencio)    Past Histories:    Past Medical History:   Diagnosis Date     Asthma      Breast cancer     Left-sided breast cancer for which she had a mastectomy done in 1976 at [facility] for invasive ductal cancer. It was 4 cm in size with a positive left axillary lymph node.  She was treated adjuvantly with melphalan for 2 years.     GERD (gastroesophageal reflux disease)      Hyperlipidemia      Hypertension          Past Surgical History:   Procedure Laterality Date     CATARACT EXTRACTION Right      MS MASTECTOMY, RADICAL      Description: Radical Mastectomy Left Breast;  Recorded: 11/19/2008;  Comments: 1976     MS REMOVAL GALLBLADDER      Description: Cholecystectomy;  Recorded: 11/19/2008;     MS TOTAL ABDOM HYSTERECTOMY      Description: Hysterectomy;  Recorded: 06/21/2013;  Comments: 1973 Has Ovaries       Physical Exam:    Recent Vitals 3/9/2017   Height 5' 5\"   Weight 195 lbs 8 oz   BSA (m2) 2.02 m2   /64   Pulse 64   Temp 97.5   Temp src 1   SpO2 96       General:  Alert and oriented. Seated comfortably. In no distress.  Accompanied by son.  HEENT:  Full head of hair. MAGGIE, EOMI.  Anicteric sclera.  No mucosal lesions.  Lymph nodes: No palpable cervical, axillary or inguinal lymphadenopathy.  Chest:   Lungs clear.  CVS:   S1 and S2 heard. Regular rate and rhythm.  No murmur heard.  Abdomen:  Soft. Not tender or distended.  No palpable organomegaly, masses or ascites.  Extremities:  Warm.  No peripheral edema.  Skin:   No rash, bruising or purpura.    Breasts:  Left mastectomy without lumps, erythema or tenderness.  No swelling or tenderness over the " sternum.      Lab Results:    Reviewed with patient and her son.    Recent Results (from the past 24 hour(s))   Comprehensive Metabolic Panel   Result Value Ref Range    Sodium 144 136 - 145 mmol/L    Potassium 4.5 3.5 - 5.0 mmol/L    Chloride 114 (H) 98 - 107 mmol/L    CO2 22 22 - 31 mmol/L    Anion Gap, Calculation 8 5 - 18 mmol/L    Glucose 92 70 - 125 mg/dL    BUN 22 8 - 28 mg/dL    Creatinine 1.02 0.60 - 1.10 mg/dL    GFR MDRD Af Amer >60 >60 mL/min/1.73m2    GFR MDRD Non Af Amer 52 (L) >60 mL/min/1.73m2    Bilirubin, Total 0.5 0.0 - 1.0 mg/dL    Calcium 10.3 8.5 - 10.5 mg/dL    Protein, Total 6.6 6.0 - 8.0 g/dL    Albumin 3.5 3.5 - 5.0 g/dL    Alkaline Phosphatase 81 45 - 120 U/L    AST 18 0 - 40 U/L    ALT 14 0 - 45 U/L   HM1 (CBC with Diff)   Result Value Ref Range    WBC 5.7 4.0 - 11.0 thou/uL    RBC 4.54 3.80 - 5.40 mill/uL    Hemoglobin 13.6 12.0 - 16.0 g/dL    Hematocrit 41.1 35.0 - 47.0 %    MCV 90 80 - 100 fL    MCH 30.0 27.0 - 34.0 pg    MCHC 33.1 32.0 - 36.0 g/dL    RDW 13.5 11.0 - 14.5 %    Platelets 246 140 - 440 thou/uL    MPV 7.2 7.0 - 10.0 fL    Neutrophils % 74 (H) 50 - 70 %    Lymphocytes % 16 (L) 20 - 40 %    Monocytes % 8 2 - 10 %    Eosinophils % 2 0 - 6 %    Basophils % 1 0 - 2 %    Neutrophils Absolute 4.2 2.0 - 7.7 thou/uL    Lymphocytes Absolute 0.9 0.8 - 4.4 thou/uL    Monocytes Absolute 0.5 0.0 - 0.9 thou/uL    Eosinophils Absolute 0.1 0.0 - 0.4 thou/uL    Basophils Absolute 0.0 0.0 - 0.2 thou/uL       Imaging:    No results found.

## 2021-06-10 NOTE — PROGRESS NOTES
[facility] Hematology and Oncology Progress Note    Patient: Dania Hoffman  MRN: [id number]  Date of Service: 5/4/2017         Reason for Visit:    Follow-up of metastatic breast cancer (bone).    Assessment and Plan:    1) Metastatic breast cancer:    82 y.o.     1976 - left mastectomy.     Treated with melphalan for 2 years as adjuvant therapy.     2013 noted sternal lump on the same side as her mastectomy.     Needle biopsy showed this to be an invasive ductal carcinoma, ER/MO positive and HER-2 negative.     2014, February PET showed multiple bony metastasis, lung nodules and an internal mammary lymph node in addition to the mass invading the bone of the chest wall.     2014, February - began letrozole.    2016, April 11 CT chest abdomen and pelvis showed progression of disease in the sternal lesion and soft tissue posterior to the mediastinum.     Progressive rise in the CA-27-29 biochemical marker.      04/13/2016 - patient switched to palliative Faslodex, every 2 weeks as a loading dose x 3, then once monthly.     10/03/16 CT CAP - soft tissue mass deep to the sternum had decreased further in size.  No new disease.    04/05/17 CT CAP - no evidence of progression of the breast cancer and no new areas of concern seen.    05/04/17:    Feeling a bit more fatigued.  CT last month showed stable disease.  [facility]  She has gained about 20 pounds since last fall.  Her son did not bring her medication sheet from her care center.  He stated he would fax it to us later today.    CBC - basically WNL    CMP - quite stable other than an intermittently low GFR on lasix.  Also calcium a bit high @ 11.1.        CA27.29 pending (28.7, 28.3, 24.8, 22.4, 23.6, 21.5, 23.4, 22.7, 14.7, 24.0, 23.8, 25.1, 42 on prior evaluations).    Clinically and biochemically JUSTYNA.  Continue monthly Faslodex.    Encouraged wearing a neck pillow brace.  She states she has one.    06/01/17 - follow up in  consideration of monthly Faslodex and address Xgeva dosing as a bit hypercalcemic today with last dose Xgeva last month.     July - CT CAP, sooner if indicated.    Considering adding Ibrance if needed.    2) Bone metastasis:    Calcium a bit elevated today @ 11.1.    She has been receiving Xgeva every 3 months since January, 2016, monthly prior to that.  Last dose received a month ago.       With her calcium a bit high will give a dose today and reassess next month as to treatment interval.      Also, hold daily calcium (Tums) and vitamin D.    3) Dementia:    Stable on Aricept, 10 mg.     Her family thinks her anxiety is stable as well.       4) Pedal edema:    Improved with Lasix, 20 mg twice daily.     Her metabolic panel was reviewed and her electrolytes quite stable other than a chronic mildly elevated chloride and intermittently low GFR.     ECOG Performance:     ECOG Performance Status: 3    Distress Assessment:    Distress Assessment Score: 5        > 25 minutes with > 50% counseling and care coordination.    Josias Moeller, CNP     CC: Fidencio Welsh MD    ______________________________________________________________________________    Interim History:    The patient presents today looking and feeling a bit more tired than usual, but in good spirits, accompanied by her son.  She continues to be a resident at the [facility] assisted living facility.  Her activities consist of walking the corridors, being a part of a band and playing cards.  She denies pain, new lumps or bumps, cough, fever, chills, headache, visual disturbance, bowel or bladder issues, vaginal discharge, numbness or tingling or rashes.  Her appetite is fair and weight quite stable.  Her memory problems are stable according to her son.  She continues on Aricept and Remeron.  Her head appears more tilted to the right than when I last saw her.  She and her son state this was noted a couple months ago and was addressed by the provider  "at her care facility.    Pain Status:    Currently in Pain: No/denies    Review of Systems:    Constitutional  Constitutional (WDL): Exceptions to WDL  Fatigue: Fatigue relieved by rest  Fever: None  Chills: Mild sensation of cold, shivering, chattering of teeth (\"I'm always chilly\")  Weight Gain: None  Weight Loss: None  Neurosensory  Neurosensory (WDL): Exceptions to WDL  Peripheral Motor Neuropathy: None  Ataxia: Severe symptoms, limiting self care ADL, mechanical assistance indicated (uses walker to ambulate)  Peripheral Sensory Neuropathy: None  Confusion: None  Syncope: None  Cardiovascular  Cardiovascular (WDL): Exceptions to WDL  Palpitations: None  Edema: Yes (bilat lower extremities-wearing kenny hose today)  Edema Limbs: Grade 2  Phlebitis: None  Superficial thrombophlebitis: None  Pulmonary  Respiratory (WDL): Within Defined Limits  Gastrointestinal  Gastrointestinal (WDL): Exceptions to WDL  Anorexia: Loss of appetite without alteration in eating habits  Constipation: None  Diarrhea: None  Dysphagia: None  Esophagitis: None  Nausea: None  Pharyngitis: None  Vomiting: None  Dysgeusia: None  Dry Mouth: None  Genitourinary  Genitourinary (WDL): All genitourinary elements are within defined limits  Integumentary  Integumentary (WDL): Exceptions to WDL (dry skin)  Patient Coping  Patient Coping: Accepting  Distress Assessment  Distress Assessment Score: 5  Accompanied by  Accompanied by: Family Member    Past Histories:    Past Medical History:   Diagnosis Date     Asthma      Breast cancer     Left-sided breast cancer for which she had a mastectomy done in 1976 at [facility] for invasive ductal cancer. It was 4 cm in size with a positive left axillary lymph node.  She was treated adjuvantly with melphalan for 2 years.     GERD (gastroesophageal reflux disease)      Hyperlipidemia      Hypertension          Past Surgical History:   Procedure Laterality Date     CATARACT EXTRACTION Right      IN " MASTECTOMY, RADICAL      Description: Radical Mastectomy Left Breast;  Recorded: 11/19/2008;  Comments: 1976     OH REMOVAL GALLBLADDER      Description: Cholecystectomy;  Recorded: 11/19/2008;     OH TOTAL ABDOM HYSTERECTOMY      Description: Hysterectomy;  Recorded: 06/21/2013;  Comments: 1973 Has Ovaries       Physical Exam:    Recent Vitals 5/4/2017   /66   Pulse 66   Temp 97.5   Temp src 1   SpO2 98       General:  Alert and oriented.  Head slightly tilted to right.  States this started happening a couple months ago and has been using a wedge pillow at her care center.  Seated comfortably.  In no distress.  Accompanied by son.  HEENT:  Full head of hair. MAGGIE, EOMI.  Anicteric sclera.  No mucosal lesions.  Lymph nodes: No palpable cervical, axillary or inguinal lymphadenopathy.  Chest:   Lungs clear.  CVS:   S1 and S2 heard. Regular rate and rhythm.  No murmur heard.  Abdomen:  Soft. Not tender or distended.  No palpable organomegaly, masses or ascites.  Extremities:  Warm.  No peripheral edema.  Skin:   No rash, bruising or purpura.  Mild pressure discoloration from chin resting on right upper chest wall.  Breasts:  Left mastectomy without lumps, erythema or tenderness.  No swelling or tenderness over the sternum.      Lab Results:    Reviewed with patient and her son.    Recent Results (from the past 24 hour(s))   Comprehensive Metabolic Panel   Result Value Ref Range    Sodium 144 136 - 145 mmol/L    Potassium 4.2 3.5 - 5.0 mmol/L    Chloride 106 98 - 107 mmol/L    CO2 26 22 - 31 mmol/L    Anion Gap, Calculation 12 5 - 18 mmol/L    Glucose 97 70 - 125 mg/dL    BUN 26 8 - 28 mg/dL    Creatinine 1.05 0.60 - 1.10 mg/dL    GFR MDRD Af Amer >60 >60 mL/min/1.73m2    GFR MDRD Non Af Amer 50 (L) >60 mL/min/1.73m2    Bilirubin, Total 0.6 0.0 - 1.0 mg/dL    Calcium 11.1 (H) 8.5 - 10.5 mg/dL    Protein, Total 6.7 6.0 - 8.0 g/dL    Albumin 3.5 3.5 - 5.0 g/dL    Alkaline Phosphatase 90 45 - 120 U/L    AST 20 0 -  40 U/L    ALT 15 0 - 45 U/L   HM1 (CBC with Diff)   Result Value Ref Range    WBC 4.9 4.0 - 11.0 thou/uL    RBC 4.47 3.80 - 5.40 mill/uL    Hemoglobin 13.5 12.0 - 16.0 g/dL    Hematocrit 40.7 35.0 - 47.0 %    MCV 91 80 - 100 fL    MCH 30.2 27.0 - 34.0 pg    MCHC 33.2 32.0 - 36.0 g/dL    RDW 14.4 11.0 - 14.5 %    Platelets 212 140 - 440 thou/uL    MPV 9.0 8.5 - 12.5 fL    Neutrophils % 73 (H) 50 - 70 %    Lymphocytes % 15 (L) 20 - 40 %    Monocytes % 8 2 - 10 %    Eosinophils % 4 0 - 6 %    Basophils % 0 0 - 2 %    Neutrophils Absolute 3.6 2.0 - 7.7 thou/uL    Lymphocytes Absolute 0.7 (L) 0.8 - 4.4 thou/uL    Monocytes Absolute 0.4 0.0 - 0.9 thou/uL    Eosinophils Absolute 0.2 0.0 - 0.4 thou/uL    Basophils Absolute 0.0 0.0 - 0.2 thou/uL       Imaging:    No new imaging.

## 2021-06-10 NOTE — PROGRESS NOTES
Pt. Here with her son, Homer. Xgeva sub q injection administered in left lower abdomen. Foslodex injections administered with Марина Mercado RN in bilateral outer upper gluteal muscles.Pt. tolerated injections without any problems.

## 2021-06-15 PROBLEM — Z79.818 USE OF FULVESTRANT (FASLODEX): Status: ACTIVE | Noted: 2017-03-09

## 2021-06-15 PROBLEM — E83.52 HYPERCALCEMIA: Status: ACTIVE | Noted: 2017-05-04

## 2021-06-15 PROBLEM — F02.818 LEWY BODY DEMENTIA WITH BEHAVIORAL DISTURBANCE (H): Status: ACTIVE | Noted: 2017-05-24

## 2021-06-15 PROBLEM — G31.83 LEWY BODY DEMENTIA WITH BEHAVIORAL DISTURBANCE (H): Status: ACTIVE | Noted: 2017-05-24

## 2021-07-03 NOTE — ADDENDUM NOTE
Addendum Note by Renetta Julio RN at 5/4/2017  4:32 PM     Author: Renetta Julio RN Service: -- Author Type: Registered Nurse    Filed: 5/4/2017  4:32 PM Encounter Date: 5/4/2017 Status: Signed    : Renetta Julio RN (Registered Nurse)    Addended by: RENETTA JULIO on: 5/4/2017 04:32 PM        Modules accepted: Orders

## 2021-07-13 ENCOUNTER — RECORDS - HEALTHEAST (OUTPATIENT)
Dept: ADMINISTRATIVE | Facility: CLINIC | Age: 86
End: 2021-07-13

## 2021-07-23 ENCOUNTER — RECORDS - HEALTHEAST (OUTPATIENT)
Dept: ADMINISTRATIVE | Facility: CLINIC | Age: 86
End: 2021-07-23